# Patient Record
Sex: MALE | URBAN - METROPOLITAN AREA
[De-identification: names, ages, dates, MRNs, and addresses within clinical notes are randomized per-mention and may not be internally consistent; named-entity substitution may affect disease eponyms.]

---

## 2023-10-06 ENCOUNTER — APPOINTMENT (RX ONLY)
Dept: URBAN - METROPOLITAN AREA CLINIC 33 | Facility: CLINIC | Age: 63
Setting detail: DERMATOLOGY
End: 2023-10-06

## 2023-10-06 DIAGNOSIS — L40.0 PSORIASIS VULGARIS: ICD-10-CM

## 2023-10-06 PROCEDURE — ? TREATMENT REGIMEN

## 2023-10-06 PROCEDURE — ? OTEZLA INITIATION

## 2023-10-06 PROCEDURE — ? PHOTO-DOCUMENTATION

## 2023-10-06 PROCEDURE — 99203 OFFICE O/P NEW LOW 30 MIN: CPT

## 2023-10-06 PROCEDURE — ? PRESCRIPTION

## 2023-10-06 PROCEDURE — ? COUNSELING

## 2023-10-06 RX ORDER — TRIAMCINOLONE ACETONIDE 1 MG/G
OINTMENT TOPICAL BID
Qty: 454 | Refills: 3 | Status: ERX | COMMUNITY
Start: 2023-10-06

## 2023-10-06 RX ORDER — APREMILAST 30 MG/1
TABLET, FILM COATED ORAL BID
Qty: 60 | Refills: 11 | Status: ERX | COMMUNITY
Start: 2023-10-06

## 2023-10-06 RX ORDER — CLOBETASOL PROPIONATE 0.5 MG/ML
SOLUTION TOPICAL BID
Qty: 50 | Refills: 3 | Status: ERX | COMMUNITY
Start: 2023-10-06

## 2023-10-06 RX ADMIN — CLOBETASOL PROPIONATE: 0.5 SOLUTION TOPICAL at 00:00

## 2023-10-06 RX ADMIN — APREMILAST: 30 TABLET, FILM COATED ORAL at 00:00

## 2023-10-06 RX ADMIN — TRIAMCINOLONE ACETONIDE: 1 OINTMENT TOPICAL at 00:00

## 2023-10-06 ASSESSMENT — LOCATION ZONE DERM
LOCATION ZONE: TRUNK
LOCATION ZONE: ARM
LOCATION ZONE: SCALP
LOCATION ZONE: LEG

## 2023-10-06 ASSESSMENT — ITCH NUMERIC RATING SCALE: HOW SEVERE IS YOUR ITCHING?: 7

## 2023-10-06 ASSESSMENT — LOCATION SIMPLE DESCRIPTION DERM
LOCATION SIMPLE: RIGHT BUTTOCK
LOCATION SIMPLE: LEFT PRETIBIAL REGION
LOCATION SIMPLE: LEFT SCALP
LOCATION SIMPLE: LEFT FOREARM
LOCATION SIMPLE: LEFT BUTTOCK
LOCATION SIMPLE: RIGHT PRETIBIAL REGION
LOCATION SIMPLE: LOWER BACK
LOCATION SIMPLE: RIGHT FOREARM

## 2023-10-06 ASSESSMENT — LOCATION DETAILED DESCRIPTION DERM
LOCATION DETAILED: LEFT VENTRAL PROXIMAL FOREARM
LOCATION DETAILED: SUPERIOR LUMBAR SPINE
LOCATION DETAILED: LEFT PROXIMAL PRETIBIAL REGION
LOCATION DETAILED: LEFT BUTTOCK
LOCATION DETAILED: RIGHT BUTTOCK
LOCATION DETAILED: RIGHT PROXIMAL PRETIBIAL REGION
LOCATION DETAILED: LEFT MEDIAL FRONTAL SCALP
LOCATION DETAILED: RIGHT VENTRAL PROXIMAL FOREARM

## 2023-10-06 ASSESSMENT — BSA PSORIASIS: % BODY COVERED IN PSORIASIS: 20

## 2023-10-06 NOTE — HPI: PSORIASIS (PATIENT REPORTED)
Do You Have A Family History Of Psoriasis?: no
Where Is Your Psoriasis Located?: Lower legs, arms and back
Additional History: Pt is here concerning psoriasis involving his calf, arms, and back. Pt was being seen for psoriasis in [facility]. Pt states that specialist in [facility] were going to start him on an injectable medication but could start due to having to move to the [place]. Pt was previously on methotrexate.\\nPt states that he was using a cream otc that makes psoriasis temporarily better.\\n\\nItch scale 5/10

## 2023-10-06 NOTE — PROCEDURE: TREATMENT REGIMEN
Action 3: Continue
Action 1: Start
Plan: We will start Pt on clobetasol 0.05 % scalp solution Bid and triamcinolone acetonide 0.1 % topical ointment Bid for flaring.\\n\\nWe will start pt on Otezla. Samples given today.\\n\\nRTC 3 mo
Treatment 1: Otezla 30mg
Detail Level: Zone
Initiate Regimen: clobetasol 0.05 % scalp solution Bid\\nApply a small amount to affected areas on the scalp twice daily x 2 weeks prn itching\\n\\ntriamcinolone acetonide 0.1 % topical ointment Bid\\nApply thin layer to the psoriasis flares on legs, arms, and abdomen twice daily x 2 weeks per month prn flares

## 2024-01-12 ENCOUNTER — APPOINTMENT (RX ONLY)
Dept: URBAN - METROPOLITAN AREA CLINIC 33 | Facility: CLINIC | Age: 64
Setting detail: DERMATOLOGY
End: 2024-01-12

## 2024-01-12 DIAGNOSIS — L40.0 PSORIASIS VULGARIS: ICD-10-CM

## 2024-01-12 PROCEDURE — ? COUNSELING

## 2024-01-12 PROCEDURE — ? PHOTO-DOCUMENTATION

## 2024-01-12 PROCEDURE — ? PRESCRIPTION MEDICATION MANAGEMENT

## 2024-01-12 PROCEDURE — 99213 OFFICE O/P EST LOW 20 MIN: CPT

## 2024-01-12 PROCEDURE — ? OTEZLA MONITORING

## 2024-01-12 ASSESSMENT — LOCATION DETAILED DESCRIPTION DERM
LOCATION DETAILED: LEFT BUTTOCK
LOCATION DETAILED: LEFT VENTRAL PROXIMAL FOREARM
LOCATION DETAILED: RIGHT PROXIMAL PRETIBIAL REGION
LOCATION DETAILED: RIGHT BUTTOCK
LOCATION DETAILED: RIGHT VENTRAL PROXIMAL FOREARM
LOCATION DETAILED: SUPERIOR LUMBAR SPINE
LOCATION DETAILED: LEFT MEDIAL FRONTAL SCALP
LOCATION DETAILED: LEFT PROXIMAL PRETIBIAL REGION

## 2024-01-12 ASSESSMENT — LOCATION ZONE DERM
LOCATION ZONE: LEG
LOCATION ZONE: ARM
LOCATION ZONE: SCALP
LOCATION ZONE: TRUNK

## 2024-01-12 ASSESSMENT — LOCATION SIMPLE DESCRIPTION DERM
LOCATION SIMPLE: LEFT BUTTOCK
LOCATION SIMPLE: LOWER BACK
LOCATION SIMPLE: RIGHT FOREARM
LOCATION SIMPLE: RIGHT PRETIBIAL REGION
LOCATION SIMPLE: LEFT SCALP
LOCATION SIMPLE: RIGHT BUTTOCK
LOCATION SIMPLE: LEFT PRETIBIAL REGION
LOCATION SIMPLE: LEFT FOREARM

## 2024-01-12 NOTE — PROCEDURE: PRESCRIPTION MEDICATION MANAGEMENT
Continue Regimen: clobetasol 0.05 % scalp solution Bid\\nApply a small amount to affected areas on the scalp twice daily x 2 weeks prn itching\\n\\ntriamcinolone acetonide 0.1 % topical ointment Bid\\nApply thin layer to the psoriasis flares on legs, arms, and abdomen twice daily x 2 weeks per month prn flares\\n\\nOtezla 30mg BID
Render In Strict Bullet Format?: No
Detail Level: Zone
Plan: We will give pt more samples today.\\nWe will update pt insurance and advised pt to call pharmacy regarding Otezla \\n\\nRTC 4 weeks to f/u
Samples Given: Otezla x4

## 2024-02-09 ENCOUNTER — RX ONLY (OUTPATIENT)
Age: 64
Setting detail: RX ONLY
End: 2024-02-09

## 2024-02-09 ENCOUNTER — APPOINTMENT (RX ONLY)
Dept: URBAN - METROPOLITAN AREA CLINIC 33 | Facility: CLINIC | Age: 64
Setting detail: DERMATOLOGY
End: 2024-02-09

## 2024-02-09 DIAGNOSIS — L40.0 PSORIASIS VULGARIS: ICD-10-CM | Status: IMPROVED

## 2024-02-09 PROCEDURE — ? OTEZLA MONITORING

## 2024-02-09 PROCEDURE — ? PRESCRIPTION MEDICATION MANAGEMENT

## 2024-02-09 PROCEDURE — ? PHOTO-DOCUMENTATION

## 2024-02-09 PROCEDURE — ? COUNSELING

## 2024-02-09 PROCEDURE — 99213 OFFICE O/P EST LOW 20 MIN: CPT

## 2024-02-09 RX ORDER — TRIAMCINOLONE ACETONIDE 1 MG/G
OINTMENT TOPICAL BID
Qty: 454 | Refills: 3 | Status: ERX

## 2024-02-09 RX ORDER — CLOBETASOL PROPIONATE 0.5 MG/ML
SOLUTION TOPICAL BID
Qty: 50 | Refills: 3 | Status: ERX

## 2024-02-09 ASSESSMENT — LOCATION SIMPLE DESCRIPTION DERM
LOCATION SIMPLE: RIGHT BUTTOCK
LOCATION SIMPLE: RIGHT PRETIBIAL REGION
LOCATION SIMPLE: LOWER BACK
LOCATION SIMPLE: RIGHT FOREARM
LOCATION SIMPLE: LEFT BUTTOCK
LOCATION SIMPLE: LEFT FOREARM
LOCATION SIMPLE: LEFT SCALP
LOCATION SIMPLE: LEFT PRETIBIAL REGION

## 2024-02-09 ASSESSMENT — LOCATION DETAILED DESCRIPTION DERM
LOCATION DETAILED: RIGHT BUTTOCK
LOCATION DETAILED: LEFT BUTTOCK
LOCATION DETAILED: LEFT PROXIMAL PRETIBIAL REGION
LOCATION DETAILED: RIGHT PROXIMAL PRETIBIAL REGION
LOCATION DETAILED: LEFT VENTRAL PROXIMAL FOREARM
LOCATION DETAILED: SUPERIOR LUMBAR SPINE
LOCATION DETAILED: LEFT MEDIAL FRONTAL SCALP
LOCATION DETAILED: RIGHT VENTRAL PROXIMAL FOREARM

## 2024-02-09 ASSESSMENT — LOCATION ZONE DERM
LOCATION ZONE: TRUNK
LOCATION ZONE: ARM
LOCATION ZONE: LEG
LOCATION ZONE: SCALP

## 2024-02-09 ASSESSMENT — ITCH NUMERIC RATING SCALE: HOW SEVERE IS YOUR ITCHING?: 3

## 2024-02-09 NOTE — PROCEDURE: PRESCRIPTION MEDICATION MANAGEMENT
Continue Regimen: clobetasol 0.05 % scalp solution Bid\\nApply a small amount to affected areas on the scalp twice daily x 2 weeks prn itching( refills sent in today). \\n\\ntriamcinolone acetonide 0.1 % topical ointment Bid\\nApply thin layer to the psoriasis flares on legs, arms, and abdomen twice daily x 2 weeks per month prn flares\\n( refills sent today). \\nOtezla 30mg BID samples given today( 3 packs).
Render In Strict Bullet Format?: No
Detail Level: Zone
Plan: Will give Otezla samples until patient’s come in the mail. (  Roopa Bsutamante ( [id number]).
Samples Given: Otezla x4

## 2024-03-22 ENCOUNTER — APPOINTMENT (RX ONLY)
Dept: URBAN - METROPOLITAN AREA CLINIC 33 | Facility: CLINIC | Age: 64
Setting detail: DERMATOLOGY
End: 2024-03-22

## 2024-03-22 DIAGNOSIS — L40.0 PSORIASIS VULGARIS: ICD-10-CM | Status: INADEQUATELY CONTROLLED

## 2024-03-22 PROCEDURE — ? PHOTO-DOCUMENTATION

## 2024-03-22 PROCEDURE — ? COUNSELING

## 2024-03-22 PROCEDURE — ? ORDER TESTS

## 2024-03-22 PROCEDURE — ? SKYRIZI INITIATION

## 2024-03-22 PROCEDURE — ? PRESCRIPTION MEDICATION MANAGEMENT

## 2024-03-22 PROCEDURE — 99213 OFFICE O/P EST LOW 20 MIN: CPT

## 2024-03-22 ASSESSMENT — LOCATION DETAILED DESCRIPTION DERM
LOCATION DETAILED: RIGHT PROXIMAL PRETIBIAL REGION
LOCATION DETAILED: LEFT MEDIAL FRONTAL SCALP
LOCATION DETAILED: SUPERIOR LUMBAR SPINE
LOCATION DETAILED: LEFT VENTRAL PROXIMAL FOREARM
LOCATION DETAILED: RIGHT BUTTOCK
LOCATION DETAILED: LEFT PROXIMAL PRETIBIAL REGION
LOCATION DETAILED: RIGHT VENTRAL PROXIMAL FOREARM
LOCATION DETAILED: LEFT BUTTOCK

## 2024-03-22 ASSESSMENT — LOCATION SIMPLE DESCRIPTION DERM
LOCATION SIMPLE: LEFT FOREARM
LOCATION SIMPLE: RIGHT FOREARM
LOCATION SIMPLE: RIGHT BUTTOCK
LOCATION SIMPLE: LEFT PRETIBIAL REGION
LOCATION SIMPLE: LEFT BUTTOCK
LOCATION SIMPLE: LOWER BACK
LOCATION SIMPLE: LEFT SCALP
LOCATION SIMPLE: RIGHT PRETIBIAL REGION

## 2024-03-22 ASSESSMENT — LOCATION ZONE DERM
LOCATION ZONE: TRUNK
LOCATION ZONE: SCALP
LOCATION ZONE: ARM
LOCATION ZONE: LEG

## 2024-03-22 ASSESSMENT — BSA PSORIASIS: % BODY COVERED IN PSORIASIS: 20

## 2024-03-22 ASSESSMENT — ITCH NUMERIC RATING SCALE: HOW SEVERE IS YOUR ITCHING?: 8

## 2024-03-22 NOTE — PROCEDURE: ORDER TESTS
Performing Laboratory: [id number]
Expected Date Of Service: 03/22/2024
Bill For Surgical Tray: no
Billing Type: Third-Party Bill
Lab Facility: [id number]

## 2024-03-22 NOTE — PROCEDURE: PRESCRIPTION MEDICATION MANAGEMENT
Continue Regimen: clobetasol 0.05 % scalp solution Bid\\nApply a small amount to affected areas on the scalp twice daily x 2 weeks prn itching( refills sent in today). \\n\\ntriamcinolone acetonide 0.1 % topical ointment Bid\\nApply thin layer to the psoriasis flares on legs, arms, and abdomen twice daily x 2 weeks per month prn flares\\n\\nOtezla 30mg BID
Render In Strict Bullet Format?: No
Detail Level: Zone
Plan: Discussed starting Taltz or Skyrizi (  Fredy ([id number]))\\nInitiated Skyrizi paperwork today. Labs obtained. \\n\\nRTC 4 weeks

## 2024-03-22 NOTE — PROCEDURE: SKYRIZI INITIATION
Is Methotrexate Contraindicated?: No
Detail Level: Zone
Skyrizi Dosing: 150 mg SC week 0 and week 4 then every 12 weeks thereafter
Include Weight Question: Yes - Pounds
Pregnancy And Lactation Warning Text: The risk during pregnancy and breastfeeding is uncertain with this medication.
Diagnosis (Required): Psoriasis
Skyrizi Monitoring Guidelines: A yearly test for tuberculosis is required while taking Skyrizi.

## 2024-03-27 ENCOUNTER — RX ONLY (OUTPATIENT)
Age: 64
Setting detail: RX ONLY
End: 2024-03-27

## 2024-03-27 RX ORDER — RISANKIZUMAB-RZAA 150 MG/ML
INJECTION SUBCUTANEOUS
Qty: 1 | Refills: 4 | Status: ERX | COMMUNITY
Start: 2024-03-27

## 2024-03-27 RX ORDER — RISANKIZUMAB-RZAA 150 MG/ML
INJECTION SUBCUTANEOUS
Qty: 2 | Refills: 0 | Status: ERX

## 2024-04-01 ENCOUNTER — RX ONLY (OUTPATIENT)
Age: 64
Setting detail: RX ONLY
End: 2024-04-01

## 2024-04-01 RX ORDER — RISANKIZUMAB-RZAA 150 MG/ML
INJECTION SUBCUTANEOUS
Qty: 2 | Refills: 0 | Status: ERX

## 2024-04-01 RX ORDER — RISANKIZUMAB-RZAA 150 MG/ML
INJECTION SUBCUTANEOUS
Qty: 1 | Refills: 4 | Status: ERX

## 2024-04-12 ENCOUNTER — RX ONLY (OUTPATIENT)
Age: 64
Setting detail: RX ONLY
End: 2024-04-12

## 2024-04-12 RX ORDER — RISANKIZUMAB-RZAA 150 MG/ML
INJECTION SUBCUTANEOUS
Qty: 2 | Refills: 0 | Status: ERX

## 2024-04-12 RX ORDER — RISANKIZUMAB-RZAA 150 MG/ML
INJECTION SUBCUTANEOUS
Qty: 1 | Refills: 6 | Status: ERX

## 2024-05-03 ENCOUNTER — RX ONLY (OUTPATIENT)
Age: 64
Setting detail: RX ONLY
End: 2024-05-03

## 2024-05-03 ENCOUNTER — APPOINTMENT (RX ONLY)
Dept: URBAN - METROPOLITAN AREA CLINIC 33 | Facility: CLINIC | Age: 64
Setting detail: DERMATOLOGY
End: 2024-05-03

## 2024-05-03 DIAGNOSIS — L40.0 PSORIASIS VULGARIS: ICD-10-CM

## 2024-05-03 PROCEDURE — ? SKYRIZI INJECTION

## 2024-05-03 PROCEDURE — ? COUNSELING

## 2024-05-03 PROCEDURE — 96372 THER/PROPH/DIAG INJ SC/IM: CPT

## 2024-05-03 RX ORDER — CLOBETASOL PROPIONATE 0.5 MG/ML
SOLUTION TOPICAL BID
Qty: 50 | Refills: 3 | Status: ERX

## 2024-05-03 RX ORDER — TRIAMCINOLONE ACETONIDE 1 MG/G
OINTMENT TOPICAL BID
Qty: 454 | Refills: 3 | Status: ERX

## 2024-05-03 ASSESSMENT — LOCATION DETAILED DESCRIPTION DERM
LOCATION DETAILED: LEFT SUPERIOR MEDIAL UPPER BACK
LOCATION DETAILED: LEFT ANTERIOR PROXIMAL THIGH

## 2024-05-03 ASSESSMENT — LOCATION SIMPLE DESCRIPTION DERM
LOCATION SIMPLE: LEFT UPPER BACK
LOCATION SIMPLE: LEFT THIGH

## 2024-05-03 ASSESSMENT — LOCATION ZONE DERM
LOCATION ZONE: LEG
LOCATION ZONE: TRUNK

## 2024-05-03 NOTE — PROCEDURE: SKYRIZI INJECTION
Syringe Size Used (Required For Enhanced Ndc): 150 mg/ml Prefilled Syringe
Date Of Next Injection: Other Time Frame (Enter Below)
Render If Medication Purchased By Clinic In Visit Note?: Yes
Consent: The risks of pain and injection site reactions were reviewed with the patient prior to the injection.
Administered By (Optional): Radha Davila MA
Expiration Date (Optional): Mar2025
Other Time Frame Value: 4 weeks
[id number] Billing Preferences: By Number of Body Touches
Additional Comments: Pt tolerated injection well. Pt injected with his own Rx
Ndc (150mg/Ml Syringe): 8226-3025-25
Detail Level: None
Ndc (75mg/0.83ml Syringe): [address]-63
Hide Non-Enhanced Ndc Variable: No
Lot # (Optional): [id number]
Skyrizi Amount: 150 mg
Treatment Number (Optional): 1
J-Code:

## 2024-05-31 ENCOUNTER — APPOINTMENT (RX ONLY)
Dept: URBAN - METROPOLITAN AREA CLINIC 33 | Facility: CLINIC | Age: 64
Setting detail: DERMATOLOGY
End: 2024-05-31

## 2024-06-06 ENCOUNTER — APPOINTMENT (RX ONLY)
Dept: URBAN - METROPOLITAN AREA CLINIC 33 | Facility: CLINIC | Age: 64
Setting detail: DERMATOLOGY
End: 2024-06-06

## 2024-06-06 DIAGNOSIS — L40.0 PSORIASIS VULGARIS: ICD-10-CM

## 2024-06-06 PROCEDURE — 96372 THER/PROPH/DIAG INJ SC/IM: CPT

## 2024-06-06 PROCEDURE — ? SKYRIZI INJECTION

## 2024-06-06 ASSESSMENT — LOCATION SIMPLE DESCRIPTION DERM: LOCATION SIMPLE: RIGHT THIGH

## 2024-06-06 ASSESSMENT — LOCATION DETAILED DESCRIPTION DERM: LOCATION DETAILED: RIGHT ANTERIOR PROXIMAL THIGH

## 2024-06-06 ASSESSMENT — LOCATION ZONE DERM: LOCATION ZONE: LEG

## 2024-06-06 NOTE — PROCEDURE: SKYRIZI INJECTION
Detail Level: None
Consent: The risks of pain and injection site reactions were reviewed with the patient prior to the injection.
Syringe Size Used (Required For Enhanced Ndc): 150 mg/ml Auto-Injector
Render If Medication Purchased By Clinic In Visit Note?: Yes
Skyrizi Amount: 150 mg
Was The Medication Purchased By The Clinic?: No
Expiration Date (Optional): 04/2025
Additional Comments: Patient tolerated injections well. RTC in 12 weeks for injection
Ndc (75mg/0.83ml Syringe): [address]-30
Ndc (150mg/Ml Syringe): 1668-6363-44
25710 Billing Preferences: By Number of Body Touches
Date Of Next Injection: 12 Weeks
Administered By (Optional): Alicia
Lot # (Optional): [id number]
J-Code:

## 2024-09-06 ENCOUNTER — RX ONLY (OUTPATIENT)
Age: 64
Setting detail: RX ONLY
End: 2024-09-06

## 2024-09-06 ENCOUNTER — APPOINTMENT (RX ONLY)
Dept: URBAN - METROPOLITAN AREA CLINIC 33 | Facility: CLINIC | Age: 64
Setting detail: DERMATOLOGY
End: 2024-09-06

## 2024-09-06 DIAGNOSIS — L40.0 PSORIASIS VULGARIS: ICD-10-CM

## 2024-09-06 PROCEDURE — ? SKYRIZI MONITORING

## 2024-09-06 PROCEDURE — 99213 OFFICE O/P EST LOW 20 MIN: CPT | Mod: 25

## 2024-09-06 PROCEDURE — ? COUNSELING

## 2024-09-06 PROCEDURE — ? SEPARATE AND IDENTIFIABLE DOCUMENTATION

## 2024-09-06 PROCEDURE — ? SKYRIZI INJECTION

## 2024-09-06 PROCEDURE — 96372 THER/PROPH/DIAG INJ SC/IM: CPT

## 2024-09-06 PROCEDURE — ? PRESCRIPTION MEDICATION MANAGEMENT

## 2024-09-06 RX ORDER — CLOBETASOL PROPIONATE 0.5 MG/ML
SOLUTION TOPICAL BID
Qty: 50 | Refills: 3 | Status: ERX

## 2024-09-06 ASSESSMENT — LOCATION DETAILED DESCRIPTION DERM: LOCATION DETAILED: RIGHT ANTERIOR PROXIMAL THIGH

## 2024-09-06 ASSESSMENT — LOCATION ZONE DERM: LOCATION ZONE: LEG

## 2024-09-06 ASSESSMENT — LOCATION SIMPLE DESCRIPTION DERM: LOCATION SIMPLE: RIGHT THIGH

## 2024-09-06 NOTE — HPI: MEDICATION (SKYRIZI)
Is This A New Presentation, Or A Follow-Up?: Follow Up Skyrizi
How Severe Is It?: mild
Additional History: Patient is present today for psoriasis and Skyrizi follow up. Patient stated he is doing better.

## 2024-09-06 NOTE — PROCEDURE: PRESCRIPTION MEDICATION MANAGEMENT
Render In Strict Bullet Format?: No
Plan: Improved.\\n\\nRTC 3 months for Skyrizi injection and Psoriasis f/u.
Continue Regimen: Skyrizi\\n\\nClobetasol solution (refilled)
Detail Level: Zone

## 2024-09-06 NOTE — PROCEDURE: SKYRIZI INJECTION
Detail Level: None
Consent: The risks of pain and injection site reactions were reviewed with the patient prior to the injection.
Treatment Number (Optional): 2
Syringe Size Used (Required For Enhanced Ndc): 150 mg/ml Auto-Injector
Render If Medication Purchased By Clinic In Visit Note?: Yes
Skyrizi Amount: 150 mg
Was The Medication Purchased By The Clinic?: No
Expiration Date (Optional): 11/2025
Additional Comments: Patient tolerated injections well. Patient brought his medication. RTC in 12 weeks for injection
Ndc (75mg/0.83ml Syringe): [address]-02
Ndc (150mg/Ml Syringe): 0845-9434-11
43034 Billing Preferences: By Number of Body Touches
Date Of Next Injection: 12 Weeks
Administered By (Optional): Eduardo Hatfield MA
Lot # (Optional): [id number]
J-Code:

## 2024-12-13 ENCOUNTER — APPOINTMENT (OUTPATIENT)
Dept: URBAN - METROPOLITAN AREA CLINIC 33 | Facility: CLINIC | Age: 64
Setting detail: DERMATOLOGY
End: 2024-12-13

## 2024-12-13 DIAGNOSIS — L40.0 PSORIASIS VULGARIS: ICD-10-CM

## 2024-12-13 PROCEDURE — ? SKYRIZI INJECTION

## 2024-12-13 PROCEDURE — 96372 THER/PROPH/DIAG INJ SC/IM: CPT

## 2024-12-13 ASSESSMENT — LOCATION SIMPLE DESCRIPTION DERM: LOCATION SIMPLE: LEFT THIGH

## 2024-12-13 ASSESSMENT — LOCATION DETAILED DESCRIPTION DERM: LOCATION DETAILED: LEFT ANTERIOR PROXIMAL THIGH

## 2024-12-13 ASSESSMENT — LOCATION ZONE DERM: LOCATION ZONE: LEG

## 2024-12-13 NOTE — PROCEDURE: SKYRIZI INJECTION
Detail Level: None
Consent: The risks of pain and injection site reactions were reviewed with the patient prior to the injection.
Treatment Number (Optional): 3
Syringe Size Used (Required For Enhanced Ndc): 150 mg/ml Auto-Injector
Render If Medication Purchased By Clinic In Visit Note?: Yes
Skyrizi Amount: 150 mg
Was The Medication Purchased By The Clinic?: No
Expiration Date (Optional): 2/2026
Additional Comments: Patient tolerated injection well. Patient brought his medication. RTC in 12 weeks for injection
Ndc (75mg/0.83ml Syringe): [address]-43
Ndc (150mg/Ml Syringe): 6962-0618-99
72003 Billing Preferences: By Number of Body Touches
Date Of Next Injection: 12 Weeks
Administered By (Optional): Radha Davila MA
Lot # (Optional): [id number]
J-Code:

## 2025-03-14 ENCOUNTER — APPOINTMENT (OUTPATIENT)
Dept: URBAN - METROPOLITAN AREA CLINIC 33 | Facility: CLINIC | Age: 65
Setting detail: DERMATOLOGY
End: 2025-03-14

## 2025-03-14 ENCOUNTER — RX ONLY (RX ONLY)
Age: 65
End: 2025-03-14

## 2025-03-14 DIAGNOSIS — L40.0 PSORIASIS VULGARIS: ICD-10-CM | Status: WELL CONTROLLED

## 2025-03-14 PROCEDURE — 96372 THER/PROPH/DIAG INJ SC/IM: CPT

## 2025-03-14 PROCEDURE — ? SKYRIZI INJECTION

## 2025-03-14 PROCEDURE — ? PRESCRIPTION MEDICATION MANAGEMENT

## 2025-03-14 PROCEDURE — ? SKYRIZI MONITORING

## 2025-03-14 PROCEDURE — ? COUNSELING

## 2025-03-14 PROCEDURE — ? ORDER TESTS

## 2025-03-14 RX ORDER — TRIAMCINOLONE ACETONIDE 1 MG/G
OINTMENT TOPICAL BID
Qty: 454 | Refills: 3 | Status: ERX | COMMUNITY
Start: 2025-03-14

## 2025-03-14 RX ORDER — CLOBETASOL PROPIONATE 0.5 MG/ML
SOLUTION TOPICAL BID
Qty: 50 | Refills: 3 | Status: ERX

## 2025-03-14 ASSESSMENT — LOCATION SIMPLE DESCRIPTION DERM: LOCATION SIMPLE: RIGHT THIGH

## 2025-03-14 ASSESSMENT — LOCATION DETAILED DESCRIPTION DERM: LOCATION DETAILED: RIGHT ANTERIOR PROXIMAL THIGH

## 2025-03-14 ASSESSMENT — LOCATION ZONE DERM: LOCATION ZONE: LEG

## 2025-03-14 ASSESSMENT — ITCH NUMERIC RATING SCALE: HOW SEVERE IS YOUR ITCHING?: 0

## 2025-03-14 ASSESSMENT — BSA PSORIASIS: % BODY COVERED IN PSORIASIS: 0

## 2025-03-14 NOTE — PROCEDURE: SKYRIZI INJECTION
Detail Level: None
Consent: The risks of pain and injection site reactions were reviewed with the patient prior to the injection.
Treatment Number (Optional): 6
Syringe Size Used (Required For Enhanced Ndc): 150 mg/ml Auto-Injector
Render If Medication Purchased By Clinic In Visit Note?: Yes
Skyrizi Amount: 150 mg
Was The Medication Purchased By The Clinic?: No
Expiration Date (Optional): 2/2026
Additional Comments: Patient tolerated injection well. Patient brought his medication. RTC in 12 weeks for injection
Ndc (75mg/0.83ml Syringe): [address]-74
Ndc (150mg/Ml Syringe): 2193-7154-06
25244 Billing Preferences: By Number of Body Touches
Date Of Next Injection: 12 Weeks
Administered By (Optional): Radha Davila MA
Lot # (Optional): [id number]
J-Code:

## 2025-03-14 NOTE — PROCEDURE: ORDER TESTS
Expected Date Of Service: 03/14/2025
Performing Laboratory: [id number]
Billing Type: Third-Party Bill
Bill For Surgical Tray: no
Lab Facility: [id number]

## 2025-03-14 NOTE — PROCEDURE: PRESCRIPTION MEDICATION MANAGEMENT
Continue Regimen: Skyrizi 150mg every 12 weeks\\nTriamcinolone 0.1% BID PRN\\nClobetasol 0.05% BID PRN
Render In Strict Bullet Format?: No
Detail Level: Zone

## 2025-05-29 ENCOUNTER — RX ONLY (RX ONLY)
Age: 65
End: 2025-05-29

## 2025-05-29 RX ORDER — RISANKIZUMAB-RZAA 150 MG/ML
INJECTION SUBCUTANEOUS
Qty: 1 | Refills: 6 | Status: ERX

## 2025-06-06 ENCOUNTER — APPOINTMENT (OUTPATIENT)
Dept: URBAN - METROPOLITAN AREA CLINIC 33 | Facility: CLINIC | Age: 65
Setting detail: DERMATOLOGY
End: 2025-06-06

## 2025-06-06 DIAGNOSIS — L40.0 PSORIASIS VULGARIS: ICD-10-CM

## 2025-06-06 PROCEDURE — ? SKYRIZI INJECTION

## 2025-06-06 ASSESSMENT — LOCATION ZONE DERM: LOCATION ZONE: LEG

## 2025-06-06 ASSESSMENT — LOCATION DETAILED DESCRIPTION DERM: LOCATION DETAILED: RIGHT ANTERIOR PROXIMAL THIGH

## 2025-06-06 ASSESSMENT — LOCATION SIMPLE DESCRIPTION DERM: LOCATION SIMPLE: RIGHT THIGH

## 2025-06-06 NOTE — PROCEDURE: SKYRIZI INJECTION
Detail Level: None
Consent: The risks of pain and injection site reactions were reviewed with the patient prior to the injection.
Treatment Number (Optional): 6
Syringe Size Used (Required For Enhanced Ndc): 150 mg/ml Auto-Injector
Render If Medication Purchased By Clinic In Visit Note?: Yes
Skyrizi Amount: 150 mg
Was The Medication Purchased By The Clinic?: No
Expiration Date (Optional): JULY 2026
Additional Comments: Patient tolerated injection well. Patient brought his medication.
Ndc (75mg/0.83ml Syringe): [address]-13
Ndc (150mg/Ml Syringe): 8769-1470-91
98096 Billing Preferences: By Number of Body Touches
Date Of Next Injection: 12 Weeks
Administered By (Optional): Melissa Dykes MA
Lot # (Optional): [id number]
J-Code:

## 2025-06-06 NOTE — HPI: PROCEDURE (INJECTION)
Have You Had This Injection Before?: has been previously injected
When Was Your Last Injection?: 03/2025